# Patient Record
Sex: FEMALE | Race: OTHER | HISPANIC OR LATINO | URBAN - METROPOLITAN AREA
[De-identification: names, ages, dates, MRNs, and addresses within clinical notes are randomized per-mention and may not be internally consistent; named-entity substitution may affect disease eponyms.]

---

## 2017-04-21 ENCOUNTER — OUTPATIENT (OUTPATIENT)
Dept: OUTPATIENT SERVICES | Facility: HOSPITAL | Age: 42
LOS: 1 days | Discharge: HOME | End: 2017-04-21

## 2017-06-27 DIAGNOSIS — Z01.20 ENCOUNTER FOR DENTAL EXAMINATION AND CLEANING WITHOUT ABNORMAL FINDINGS: ICD-10-CM

## 2017-09-13 ENCOUNTER — EMERGENCY (EMERGENCY)
Facility: HOSPITAL | Age: 42
LOS: 0 days | Discharge: HOME | End: 2017-09-13
Admitting: INTERNAL MEDICINE

## 2017-09-13 DIAGNOSIS — R10.12 LEFT UPPER QUADRANT PAIN: ICD-10-CM

## 2017-09-13 DIAGNOSIS — R07.81 PLEURODYNIA: ICD-10-CM

## 2020-02-04 ENCOUNTER — EMERGENCY (EMERGENCY)
Facility: HOSPITAL | Age: 45
LOS: 0 days | Discharge: HOME | End: 2020-02-04
Admitting: EMERGENCY MEDICINE
Payer: MEDICAID

## 2020-02-04 VITALS
HEART RATE: 76 BPM | TEMPERATURE: 98 F | OXYGEN SATURATION: 100 % | RESPIRATION RATE: 16 BRPM | DIASTOLIC BLOOD PRESSURE: 62 MMHG | SYSTOLIC BLOOD PRESSURE: 113 MMHG

## 2020-02-04 DIAGNOSIS — S01.511D LACERATION WITHOUT FOREIGN BODY OF LIP, SUBSEQUENT ENCOUNTER: ICD-10-CM

## 2020-02-04 DIAGNOSIS — Z48.02 ENCOUNTER FOR REMOVAL OF SUTURES: ICD-10-CM
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
## 2020-02-04 PROCEDURE — 99281 EMR DPT VST MAYX REQ PHY/QHP: CPT

## 2020-02-04 NOTE — ED PROVIDER NOTE - NS ED ROS FT
CONST: No fever, chills or bodyaches  EYES: + inner lip wound,   ENT: No ear pain or discharge, nasal discharge or congestion. No sore throat  SKIN: No rashes  NEURO: No headache, dizziness

## 2020-02-04 NOTE — ED PROVIDER NOTE - PHYSICAL EXAMINATION
CONST: Well appearing in NAD  EYES: Sclera and conjunctiva clear.  ENT: + 2 vicryl sutures placed right inner lower lip, well healed, no surrounding redness or drainage, No nasal discharge.  NEURO: A&Ox3, No focal deficits. Strength 5/5 with no sensory deficits. Steady gait

## 2020-02-04 NOTE — ED PROVIDER NOTE - PATIENT PORTAL LINK FT
You can access the FollowMyHealth Patient Portal offered by [facility] by registering at the following website: [email or web address]. By joining [facility]’s FollowMyHealth portal, you will also be able to view your health information using other applications (apps) compatible with our system.

## 2020-02-04 NOTE — ED PROVIDER NOTE - OBJECTIVE STATEMENT
44 y.o female w/ no sig pmhx presents to the ED for wound check.  Had sutures placed 8 days ago right lower internal lip at [facility] for lac.  No sutures placed on outside.  Concerned that sutures are still in place prompting  visit to the ED. Denies fever, chills, facial swelling, dental pain.

## 2020-02-04 NOTE — ED PROVIDER NOTE - CLINICAL SUMMARY MEDICAL DECISION MAKING FREE TEXT BOX
2 intact absorbable sutures.  discussed that they will be absorbed.  no s/s infection.  I have discussed the discharge plan with the patient. The patient agrees with the plan, as discussed.  The patient understands Emergency Department diagnosis is a preliminary diagnosis often based on limited information and that the patient must adhere to the follow-up plan as discussed.  The patient understands that if the symptoms worsen or if prescribed medications do not have the desired/planned effect that the patient may return to the Emergency Department at any time for further evaluation and treatment.

## 2020-02-04 NOTE — ED PROVIDER NOTE - NSFOLLOWUPINSTRUCTIONS_ED_ALL_ED_FT
Retiro del cierre de la herida, cuidados posteriores  Wound Closure Removal, Care After  Zahida esta hoja de información sobre cómo cuidarse después de que le hayan retirado los puntos (suturas), grapas, o los adhesivos cutáneos. Grimm médico también podrá darle indicaciones más específicas. Comuníquese con grimm médico si tiene problemas o preguntas.  ¿Qué puedo esperar después del procedimiento?  Después de que las suturas o las grapas se hayan retirado o los adhesivos cutáneos se hayan caído, es común tener:  Molestias e hinchazón en la kemar.Leve enrojecimiento en la kemar de la herida.Siga estas indicaciones en grimm casa:  Si tiene james venda:     Lávese las tre con agua y jabón antes de cambiar la venda (vendaje). Use desinfectante para tre si no dispone de agua y jabón.Cambie el vendaje jose se lo haya indicado el médico. Si el vendaje se moja, se ensucia o tiene mal olor, cámbielo tan pronto joes pueda. Si el vendaje esta adherido a la piel, aplique agua limpia tibia sobre el vendaje hasta que se afloje y se pueda retirar sin separar los bordes de la herida. Seque horacio la kemar con james toalla limpia y suave, dando golpecitos. No frote la herida, ya que puede sangrar.Cuidado de la herida        Mantenga la herida limpia y seca.Controle la herida todos los días para detectar signos de infección. Esté atento a los siguientes signos:  Dolor, hinchazón o enrojecimiento.Líquido o rasheeda. Calor. Pus o mal olor.Lávese las tre con agua y jabón, antes y después de tocarse la herida.Solo aplique un ungüento o crema según las indicaciones del médico. Si usa crema o ungüento, lave la kemar con agua y jabón dos veces por día para quitarlo todo. Enjuague el jabón y seque suavemente la kemar con james toalla limpia.Si se aplicaron bandas adhesivas o goma para cerrar la piel después de retirar las suturas o las grapas, déjelas en el lugar hasta que se despeguen solas. Si los bordes de las tiras adhesivas empiezan a despegarse y enroscarse, puede recortar los que estén sueltos. No retire las tiras adhesivas por completo a menos que el médico se lo indique.Continúe protegiendo la herida de lesiones. No se toque la herida. Olmito puede causar james infección.Bañarse     No tome jose roberto de inmersión, no nade ni use el jacuzzi hasta que el médico lo autorice.Pregúntele al médico cuándo puede ducharse.Siga estos pasos para ducharse:  Si tiene un vendaje, quíteselo antes de entrar en la ducha.En la ducha, permita que el agua jabonosa caiga sobre la herida. Evite frotar la herida.Cuando salga de la ducha, seque la herida dando golpecitos con james toalla limpia.Vuelva a aplicar un vendaje sobre la herida, si es necesario.Cuidado de la cicatriz     James vez que la herida haya cicatrizado por completo, tome medidas para ayudar a reducir el tamaño de la cicatriz:  Use protector solar sobre la cicatriz o cúbrala con ropa cuando se encuentre en el exterior. Las cicatrices se queman fácilmente con el sol, lo que puede empeorar la cicatrización.[place] suavemente la kemar de la cicatriz. Olmito puede reducir el grosor de la cicatriz.Instrucciones generales     [place] los medicamentos de venta ramirez y los recetados solamente jose se lo haya indicado el médico. Concurra a todas las visitas de control jose se lo haya indicado el médico. Olmito es importante.Comuníquese con un médico si:  Tiene enrojecimiento, hinchazón o dolor alrededor de la herida. Observa líquido o rasheeda que salen de la herida. La herida se siente caliente al tacto. Observa pus o percibe mal olor que salen de la herida. La herida se abre.Tiene escalofríos.Solicite ayuda de inmediato si:  Tiene fiebre.El enrojecimiento se expande desde la herida.Resumen  Cambie el vendaje jose se lo haya indicado el médico. Si el vendaje se moja, se ensucia o tiene mal olor, cámbielo tan pronto jose pueda.Controle la herida todos los días para detectar signos de infección.Lávese las tre con agua y jabón, antes y después de tocarse la herida.Esta información no tiene jose fin reemplazar el consejo del médico. Asegúrese de hacerle al médico cualquier pregunta que tenga.    Document Released: 03/16/2010 Document Revised: 01/02/2019 Document     Follow up with your primary medical doctor in 1-2 days